# Patient Record
Sex: MALE | Race: WHITE | Employment: OTHER | URBAN - METROPOLITAN AREA
[De-identification: names, ages, dates, MRNs, and addresses within clinical notes are randomized per-mention and may not be internally consistent; named-entity substitution may affect disease eponyms.]

---

## 2018-06-23 ENCOUNTER — TRANSFERRED RECORDS (OUTPATIENT)
Dept: HEALTH INFORMATION MANAGEMENT | Facility: CLINIC | Age: 83
End: 2018-06-23

## 2018-07-07 ENCOUNTER — APPOINTMENT (OUTPATIENT)
Dept: MRI IMAGING | Facility: CLINIC | Age: 83
End: 2018-07-07
Attending: EMERGENCY MEDICINE
Payer: MEDICARE

## 2018-07-07 ENCOUNTER — APPOINTMENT (OUTPATIENT)
Dept: CT IMAGING | Facility: CLINIC | Age: 83
End: 2018-07-07
Attending: EMERGENCY MEDICINE
Payer: MEDICARE

## 2018-07-07 ENCOUNTER — HOSPITAL ENCOUNTER (EMERGENCY)
Facility: CLINIC | Age: 83
Discharge: HOME | End: 2018-07-07
Attending: EMERGENCY MEDICINE | Admitting: EMERGENCY MEDICINE
Payer: MEDICARE

## 2018-07-07 ENCOUNTER — APPOINTMENT (OUTPATIENT)
Dept: GENERAL RADIOLOGY | Facility: CLINIC | Age: 83
End: 2018-07-07
Attending: EMERGENCY MEDICINE
Payer: MEDICARE

## 2018-07-07 VITALS
WEIGHT: 187 LBS | RESPIRATION RATE: 16 BRPM | SYSTOLIC BLOOD PRESSURE: 140 MMHG | HEIGHT: 70 IN | DIASTOLIC BLOOD PRESSURE: 81 MMHG | TEMPERATURE: 97.5 F | BODY MASS INDEX: 26.77 KG/M2 | OXYGEN SATURATION: 97 % | HEART RATE: 51 BPM

## 2018-07-07 DIAGNOSIS — R07.89 OTHER CHEST PAIN: ICD-10-CM

## 2018-07-07 DIAGNOSIS — R53.83 FATIGUE, UNSPECIFIED TYPE: ICD-10-CM

## 2018-07-07 LAB
ALBUMIN SERPL-MCNC: 3.5 G/DL (ref 3.4–5)
ALBUMIN UR-MCNC: NEGATIVE MG/DL
ALP SERPL-CCNC: 86 U/L (ref 40–150)
ALT SERPL W P-5'-P-CCNC: 20 U/L (ref 0–70)
ANION GAP SERPL CALCULATED.3IONS-SCNC: 4 MMOL/L (ref 3–14)
APPEARANCE UR: CLEAR
AST SERPL W P-5'-P-CCNC: 12 U/L (ref 0–45)
BASOPHILS # BLD AUTO: 0 10E9/L (ref 0–0.2)
BASOPHILS NFR BLD AUTO: 0.2 %
BILIRUB DIRECT SERPL-MCNC: <0.1 MG/DL (ref 0–0.2)
BILIRUB SERPL-MCNC: 0.5 MG/DL (ref 0.2–1.3)
BILIRUB UR QL STRIP: NEGATIVE
BUN SERPL-MCNC: 22 MG/DL (ref 7–30)
CALCIUM SERPL-MCNC: 8.5 MG/DL (ref 8.5–10.1)
CHLORIDE SERPL-SCNC: 109 MMOL/L (ref 94–109)
CO2 SERPL-SCNC: 28 MMOL/L (ref 20–32)
COLOR UR AUTO: YELLOW
CREAT SERPL-MCNC: 1.15 MG/DL (ref 0.66–1.25)
DIFFERENTIAL METHOD BLD: NORMAL
EOSINOPHIL # BLD AUTO: 0.1 10E9/L (ref 0–0.7)
EOSINOPHIL NFR BLD AUTO: 0.8 %
ERYTHROCYTE [DISTWIDTH] IN BLOOD BY AUTOMATED COUNT: 11.9 % (ref 10–15)
ETHANOL SERPL-MCNC: <0.01 G/DL
GFR SERPL CREATININE-BSD FRML MDRD: 61 ML/MIN/1.7M2
GLUCOSE BLDC GLUCOMTR-MCNC: 82 MG/DL (ref 70–99)
GLUCOSE BLDC GLUCOMTR-MCNC: 86 MG/DL (ref 70–99)
GLUCOSE SERPL-MCNC: 61 MG/DL (ref 70–99)
GLUCOSE UR STRIP-MCNC: NEGATIVE MG/DL
HCT VFR BLD AUTO: 42 % (ref 40–53)
HGB BLD-MCNC: 14.3 G/DL (ref 13.3–17.7)
HGB UR QL STRIP: NEGATIVE
IMM GRANULOCYTES # BLD: 0 10E9/L (ref 0–0.4)
IMM GRANULOCYTES NFR BLD: 0.3 %
KETONES UR STRIP-MCNC: NEGATIVE MG/DL
LEUKOCYTE ESTERASE UR QL STRIP: NEGATIVE
LYMPHOCYTES # BLD AUTO: 1.8 10E9/L (ref 0.8–5.3)
LYMPHOCYTES NFR BLD AUTO: 19.5 %
MCH RBC QN AUTO: 31.4 PG (ref 26.5–33)
MCHC RBC AUTO-ENTMCNC: 34 G/DL (ref 31.5–36.5)
MCV RBC AUTO: 92 FL (ref 78–100)
MONOCYTES # BLD AUTO: 1 10E9/L (ref 0–1.3)
MONOCYTES NFR BLD AUTO: 11.5 %
MUCOUS THREADS #/AREA URNS LPF: PRESENT /LPF
NEUTROPHILS # BLD AUTO: 6.1 10E9/L (ref 1.6–8.3)
NEUTROPHILS NFR BLD AUTO: 67.7 %
NITRATE UR QL: NEGATIVE
NRBC # BLD AUTO: 0 10*3/UL
NRBC BLD AUTO-RTO: 0 /100
NT-PROBNP SERPL-MCNC: 94 PG/ML (ref 0–1800)
PH UR STRIP: 5 PH (ref 5–7)
PLATELET # BLD AUTO: 166 10E9/L (ref 150–450)
POTASSIUM SERPL-SCNC: 4.4 MMOL/L (ref 3.4–5.3)
PROT SERPL-MCNC: 6.5 G/DL (ref 6.8–8.8)
RBC # BLD AUTO: 4.56 10E12/L (ref 4.4–5.9)
RBC #/AREA URNS AUTO: 0 /HPF (ref 0–2)
SODIUM SERPL-SCNC: 141 MMOL/L (ref 133–144)
SOURCE: ABNORMAL
SP GR UR STRIP: 1.01 (ref 1–1.03)
TROPONIN I BLD-MCNC: 0 UG/L (ref 0–0.1)
TROPONIN I BLD-MCNC: 0 UG/L (ref 0–0.1)
TSH SERPL DL<=0.005 MIU/L-ACNC: 0.77 MU/L (ref 0.4–4)
UROBILINOGEN UR STRIP-MCNC: 0 MG/DL (ref 0–2)
WBC # BLD AUTO: 9 10E9/L (ref 4–11)
WBC #/AREA URNS AUTO: <1 /HPF (ref 0–5)

## 2018-07-07 PROCEDURE — 80076 HEPATIC FUNCTION PANEL: CPT | Performed by: EMERGENCY MEDICINE

## 2018-07-07 PROCEDURE — 25000128 H RX IP 250 OP 636: Performed by: EMERGENCY MEDICINE

## 2018-07-07 PROCEDURE — 84443 ASSAY THYROID STIM HORMONE: CPT | Performed by: EMERGENCY MEDICINE

## 2018-07-07 PROCEDURE — 99285 EMERGENCY DEPT VISIT HI MDM: CPT | Mod: 25

## 2018-07-07 PROCEDURE — 84484 ASSAY OF TROPONIN QUANT: CPT

## 2018-07-07 PROCEDURE — 70450 CT HEAD/BRAIN W/O DYE: CPT

## 2018-07-07 PROCEDURE — 85025 COMPLETE CBC W/AUTO DIFF WBC: CPT | Performed by: EMERGENCY MEDICINE

## 2018-07-07 PROCEDURE — 80320 DRUG SCREEN QUANTALCOHOLS: CPT | Performed by: EMERGENCY MEDICINE

## 2018-07-07 PROCEDURE — 00000146 ZZHCL STATISTIC GLUCOSE BY METER IP

## 2018-07-07 PROCEDURE — 96360 HYDRATION IV INFUSION INIT: CPT | Mod: 59

## 2018-07-07 PROCEDURE — 71046 X-RAY EXAM CHEST 2 VIEWS: CPT

## 2018-07-07 PROCEDURE — 93005 ELECTROCARDIOGRAM TRACING: CPT

## 2018-07-07 PROCEDURE — 80048 BASIC METABOLIC PNL TOTAL CA: CPT | Performed by: EMERGENCY MEDICINE

## 2018-07-07 PROCEDURE — A9585 GADOBUTROL INJECTION: HCPCS | Performed by: EMERGENCY MEDICINE

## 2018-07-07 PROCEDURE — 96361 HYDRATE IV INFUSION ADD-ON: CPT

## 2018-07-07 PROCEDURE — 83880 ASSAY OF NATRIURETIC PEPTIDE: CPT | Performed by: EMERGENCY MEDICINE

## 2018-07-07 PROCEDURE — 70553 MRI BRAIN STEM W/O & W/DYE: CPT

## 2018-07-07 PROCEDURE — 81001 URINALYSIS AUTO W/SCOPE: CPT | Performed by: EMERGENCY MEDICINE

## 2018-07-07 RX ORDER — GADOBUTROL 604.72 MG/ML
7.5 INJECTION INTRAVENOUS ONCE
Status: COMPLETED | OUTPATIENT
Start: 2018-07-07 | End: 2018-07-07

## 2018-07-07 RX ORDER — AMOXICILLIN 500 MG
1200 CAPSULE ORAL DAILY
COMMUNITY

## 2018-07-07 RX ORDER — DUTASTERIDE 0.5 MG/1
0.5 CAPSULE, LIQUID FILLED ORAL DAILY
COMMUNITY

## 2018-07-07 RX ADMIN — GADOBUTROL 7.5 ML: 604.72 INJECTION INTRAVENOUS at 14:52

## 2018-07-07 RX ADMIN — SODIUM CHLORIDE 500 ML: 9 INJECTION, SOLUTION INTRAVENOUS at 12:01

## 2018-07-07 ASSESSMENT — ENCOUNTER SYMPTOMS
FATIGUE: 1
NAUSEA: 0
FEVER: 0
DIZZINESS: 1
VOMITING: 0
CONSTIPATION: 1
SPEECH DIFFICULTY: 1
DIARRHEA: 0

## 2018-07-07 NOTE — ED AVS SNAPSHOT
[facility] Emergency Department    [address]    [facility] [address]    Phone:  [phone]    Fax:  [phone]                                       Harsh Hernandez   MRN: [id number]    Department:  [facility] Emergency Department   Date of Visit:  7/7/2018           Patient Information     Date Of Birth          1935        Your diagnoses for this visit were:     Fatigue, unspecified type     Other chest pain        You were seen by Yaron Obrien MD.      Follow-up Information     Follow up with your Doctor. Call in 2 days.    Why:  for follow up        Follow up with [facility] Emergency Department.    Specialty:  EMERGENCY MEDICINE    Why:  If symptoms worsen    Contact information:    [address]  [facility] [address] [phone]        Discharge Instructions          *CHEST PAIN, UNCERTAIN CAUSE    Based on your exam today, the exact cause of your chest pain is not certain. Your condition does not seem serious at this time, and your pain does not appear to be coming from your heart. However, sometimes the signs of a serious problem take more time to appear. Therefore, watch for the warning signs listed below.  HOME CARE:    1. Rest today and avoid strenuous activity.  2. Take any prescribed medicine as directed.  FOLLOW UP with your doctor in 1-3 days.   GET PROMPT MEDICAL ATTENTION if any of the following occur:    A change in the type of pain: if it feels different, becomes more severe, lasts longer, or begins to spread into your shoulder, arm, neck, jaw or back    Shortness of breath or increased pain with breathing    Weakness, dizziness, or fainting    Cough with blood or dark colored sputum (phlegm)    Fever over 101  F (38.3  C)    Swelling, pain or redness in one leg    4869-4023 [employer]. [address] [place] [address], [place] [address]. All rights reserved. This information is not intended as a  substitute for professional medical care. Always follow your healthcare professional's instructions.  This information has been modified by your health care provider with permission from the publisher.      Discharge References/Attachments     DIZZINESS, UNCERTAIN CAUSE (ENGLISH)      24 Hour Appointment Hotline       To make an appointment at any [facility], call [phone] ([phone]). If you don't have a family doctor or clinic, we will help you find one. [facility] are conveniently located to serve the needs of you and your family.             Review of your medicines      Our records show that you are taking the medicines listed below. If these are incorrect, please call your family doctor or clinic.        Dose / Directions Last dose taken    aspirin 81 MG tablet   Dose:  81 mg        Take 81 mg by mouth daily   Refills:  0        dutasteride 0.5 MG capsule   Commonly known as:  AVODART   Dose:  0.5 mg        Take 0.5 mg by mouth daily   Refills:  0        fish Oil 1200 MG capsule   Dose:  1200 mg        Take 1,200 mg by mouth daily   Refills:  0        ICAPS AREDS 2 PO        Refills:  0        LESCOL PO        Refills:  0        NITROGLYCERIN SL   Dose:  0.4 mg        Place 0.4 mg under the tongue   Refills:  0                Procedures and tests performed during your visit     Procedure/Test Number of Times Performed    Alcohol ethyl 1    BNP 1    Basic metabolic panel (BMP) 1    CBC + differential 1    Cardiac Continuous Monitoring 1    EKG 12 lead 1    Glucose by meter 2    Head CT w/o contrast 1    Hepatic panel 1    ISTAT troponin 1    IV access 1    MR Brain w/o & w Contrast 1    TSH with free T4 reflex 1    Troponin POCT 2    UA with Microscopic 1    XR Chest 2 Views 1      Orders Needing Specimen Collection     None      Pending Results     Date and Time Order Name Status Description    7/7/2018 1259 EKG 12 lead Preliminary             Pending Culture Results     No orders found  from 7/5/2018 to 7/8/2018.            Pending Results Instructions     If you had any lab results that were not finalized at the time of your Discharge, you can call the ED Lab Result RN at [phone]. You will be contacted by this team for any positive Lab results or changes in treatment. The nurses are available 7 days a week from 10A to 6:30P.  You can leave a message 24 hours per day and they will return your call.        Test Results From Your Hospital Stay        7/7/2018 12:05 PM      Component Results     Component Value Ref Range & Units Status    WBC 9.0 4.0 - 11.0 10e9/L Final    RBC Count 4.56 4.4 - 5.9 10e12/L Final    Hemoglobin 14.3 13.3 - 17.7 g/dL Final    Hematocrit 42.0 40.0 - 53.0 % Final    MCV 92 78 - 100 fl Final    MCH 31.4 26.5 - 33.0 pg Final    MCHC 34.0 31.5 - 36.5 g/dL Final    RDW 11.9 10.0 - 15.0 % Final    Platelet Count 166 150 - 450 10e9/L Final    Diff Method Automated Method  Final    % Neutrophils 67.7 % Final    % Lymphocytes 19.5 % Final    % Monocytes 11.5 % Final    % Eosinophils 0.8 % Final    % Basophils 0.2 % Final    % Immature Granulocytes 0.3 % Final    Nucleated RBCs 0 0 /100 Final    Absolute Neutrophil 6.1 1.6 - 8.3 10e9/L Final    Absolute Lymphocytes 1.8 0.8 - 5.3 10e9/L Final    Absolute Monocytes 1.0 0.0 - 1.3 10e9/L Final    Absolute Eosinophils 0.1 0.0 - 0.7 10e9/L Final    Absolute Basophils 0.0 0.0 - 0.2 10e9/L Final    Abs Immature Granulocytes 0.0 0 - 0.4 10e9/L Final    Absolute Nucleated RBC 0.0  Final         7/7/2018 12:25 PM      Component Results     Component Value Ref Range & Units Status    Sodium 141 133 - 144 mmol/L Final    Potassium 4.4 3.4 - 5.3 mmol/L Final    Chloride 109 94 - 109 mmol/L Final    Carbon Dioxide 28 20 - 32 mmol/L Final    Anion Gap 4 3 - 14 mmol/L Final    Glucose 61 (L) 70 - 99 mg/dL Final    Urea Nitrogen 22 7 - 30 mg/dL Final    Creatinine 1.15 0.66 - 1.25 mg/dL Final    GFR Estimate 61 >60 mL/min/1.7m2 Final    Non   GFR Calc    GFR Estimate If Black 74 >60 mL/min/1.7m2 Final    African American GFR Calc    Calcium 8.5 8.5 - 10.1 mg/dL Final         7/7/2018 12:55 PM      Narrative     CHEST TWO VIEWS  7/7/2018 12:39 PM     HISTORY: chest discomfort;     COMPARISON: None.        Impression     IMPRESSION:   Heart and pulmonary vessels within normal limits. Lungs clear. No  pleural effusion. Sternotomy wires and degenerative changes in the  thoracic spine. Tortuous descending thoracic aorta.    LIZBETH OREILLY MD         7/7/2018 12:25 PM      Component Results     Component Value Ref Range & Units Status    N-Terminal Pro BNP Inpatient 94 0 - 1800 pg/mL Final       Reference range shown and results flagged as abnormal are suggested inpatient   cut points for confirming diagnosis if CHF in an acute setting. Establishing a   baseline value for each individual patient is useful for follow-up. An   inpatient or emergency department NT-proPBNP <300 pg/mL effectively rules out   acute CHF, with 99% negative predictive value.  The outpatient non-acute reference range for ruling out CHF is:   0-125 pg/mL (age 18 to less than 75)   0-450 pg/mL (age 75 yrs and older)           7/7/2018  1:15 PM      Narrative     CT SCAN OF THE HEAD WITHOUT CONTRAST   7/7/2018 12:33 PM     HISTORY: Some confusion, weakness.     TECHNIQUE: Axial images of the head and coronal reformations without  IV contrast material. Radiation dose for this scan was reduced using  automated exposure control, adjustment of the mA and/or kV according  to patient size, or iterative reconstruction technique.    COMPARISON: None.    FINDINGS: There is no evidence of intracranial hemorrhage, mass, acute  infarct or anomaly. There is generalized atrophy of the brain. There  is low attenuation in the white matter of the cerebral hemispheres  consistent with sequelae of small vessel ischemic disease. Ventricular  size is within normal limits without evidence of  hydrocephalus.     The visualized portions of the sinuses and mastoids appear normal. The  bony calvarium and bones of the skull base appear intact.         Impression     IMPRESSION:     1. No evidence of acute intracranial hemorrhage, mass, or herniation.  2. There is generalized atrophy of the brain. White matter changes are  present in the cerebral hemispheres that are consistent with small  vessel ischemic disease in this age patient.      ENID NICOLAS MD         7/7/2018 12:10 PM      Component Results     Component Value Ref Range & Units Status    Troponin I 0.00 0.00 - 0.10 ug/L Final         7/7/2018  1:10 PM      Component Results     Component Value Ref Range & Units Status    Color Urine Yellow  Final    Appearance Urine Clear  Final    Glucose Urine Negative NEG^Negative mg/dL Final    Bilirubin Urine Negative NEG^Negative Final    Ketones Urine Negative NEG^Negative mg/dL Final    Specific Gravity Urine 1.009 1.003 - 1.035 Final    Blood Urine Negative NEG^Negative Final    pH Urine 5.0 5.0 - 7.0 pH Final    Protein Albumin Urine Negative NEG^Negative mg/dL Final    Urobilinogen mg/dL 0.0 0.0 - 2.0 mg/dL Final    Nitrite Urine Negative NEG^Negative Final    Leukocyte Esterase Urine Negative NEG^Negative Final    Source Midstream Urine  Final    WBC Urine <1 0 - 5 /HPF Final    RBC Urine 0 0 - 2 /HPF Final    Mucous Urine Present (A) NEG^Negative /LPF Final         7/7/2018 12:41 PM      Component Results     Component Value Ref Range & Units Status    Bilirubin Direct <0.1 0.0 - 0.2 mg/dL Final    Bilirubin Total 0.5 0.2 - 1.3 mg/dL Final    Albumin 3.5 3.4 - 5.0 g/dL Final    Protein Total 6.5 (L) 6.8 - 8.8 g/dL Final    Alkaline Phosphatase 86 40 - 150 U/L Final    ALT 20 0 - 70 U/L Final    AST 12 0 - 45 U/L Final         7/7/2018 12:38 PM      Component Results     Component Value Ref Range & Units Status    Ethanol g/dL <0.01 <0.01 g/dL Final         7/7/2018 12:41 PM      Component Results      Component Value Ref Range & Units Status    TSH 0.77 0.40 - 4.00 mU/L Final         7/7/2018  3:20 PM      Narrative     MRI BRAIN WITHOUT AND WITH CONTRAST  7/7/2018 2:50 PM    HISTORY:  Dizziness chronic, weakness, slight speech change.      TECHNIQUE:  Multiplanar, multisequence MRI of the brain without and  with 7.5mL Gadavist.    COMPARISON: Head CT from earlier the same day.    FINDINGS: There is no evidence of hemorrhage, mass, acute infarct, or  anomaly. There is generalized atrophy of the brain. White matter T2  hyperintensities are seen in the cerebral hemispheres consistent with  sequelae of small vessel ischemic disease. Ventricular size is within  normal limits without evidence of hydrocephalus.     There is no abnormal intracranial enhancement.     Polypoid mucosal thickening along the inferior right maxillary sinus.  Mild mucosal thickening in the ethmoid air cells. The major arterial  T2 flow voids at the base of the brain appear patent.        Impression     IMPRESSION:    1. No evidence of acute infarct, mass, hemorrhage, or herniation.  2. There is generalized atrophy of the brain. White matter changes are  present in the cerebral hemispheres that are consistent with small  vessel ischemic disease in this age patient.      ENID NICOLAS MD         7/7/2018  1:18 PM      Component Results     Component Value Ref Range & Units Status    Glucose 86 70 - 99 mg/dL Final         7/7/2018  2:22 PM      Component Results     Component Value Ref Range & Units Status    Troponin I 0.00 0.00 - 0.10 ug/L Final         7/7/2018  3:45 PM      Component Results     Component Value Ref Range & Units Status    Glucose 82 70 - 99 mg/dL Final                Clinical Quality Measure: Blood Pressure Screening     Your blood pressure was checked while you were in the emergency department today. The last reading we obtained was  BP: 167/75 (Simultaneous filing. User may not have seen previous data.) . Please read the  "guidelines below about what these numbers mean and what you should do about them.  If your systolic blood pressure (the top number) is less than 120 and your diastolic blood pressure (the bottom number) is less than 80, then your blood pressure is normal. There is nothing more that you need to do about it.  If your systolic blood pressure (the top number) is 120-139 or your diastolic blood pressure (the bottom number) is 80-89, your blood pressure may be higher than it should be. You should have your blood pressure rechecked within a year by a primary care provider.  If your systolic blood pressure (the top number) is 140 or greater or your diastolic blood pressure (the bottom number) is 90 or greater, you may have high blood pressure. High blood pressure is treatable, but if left untreated over time it can put you at risk for heart attack, stroke, or kidney failure. You should have your blood pressure rechecked by a primary care provider within the next 4 weeks.  If your provider in the emergency department today gave you specific instructions to follow-up with your doctor or provider even sooner than that, you should follow that instruction and not wait for up to 4 weeks for your follow-up visit.        Thank you for choosing [facility]       Thank you for choosing [facility] for your care. Our goal is always to provide you with excellent care. Hearing back from our patients is one way we can continue to improve our services. Please take a few minutes to complete the written survey that you may receive in the mail after you visit with us. Thank you!        [email or web address] Information     [employer] lets you send messages to your doctor, view your test results, renew your prescriptions, schedule appointments and more. To sign up, go to [email or web address] . Click on \"Log in\" on the left side of the screen, which will take you to the Welcome page. Then click on \"Sign up Now\" on the right side of the page.     You will be " asked to enter the access code listed below, as well as some personal information. Please follow the directions to create your username and password.     Your access code is: [email or web address]  Expires: 10/5/2018  4:42 PM     Your access code will  in 90 days. If you need help or a new code, please call your [facility] clinic or [phone].        Care EveryWhere ID     This is your Care EveryWhere ID. This could be used by other organizations to access your [facility] medical records  [id number]        Equal Access to Services     [facility]: Hadleticia Pérez, wahayes hooker, thomas kaalsin veronica, berhane bailey . So [facility] [phone].    ATENCIÓN: Si habla español, tiene a guo disposición servicios gratuitos de asistencia lingüística. Huang al [phone].    We comply with applicable federal civil rights laws and [place] laws. We do not discriminate on the basis of race, color, national origin, age, disability, sex, sexual orientation, or gender identity.            After Visit Summary       This is your record. Keep this with you and show to your community pharmacist(s) and doctor(s) at your next visit.

## 2018-07-07 NOTE — DISCHARGE INSTRUCTIONS
*CHEST PAIN, UNCERTAIN CAUSE    Based on your exam today, the exact cause of your chest pain is not certain. Your condition does not seem serious at this time, and your pain does not appear to be coming from your heart. However, sometimes the signs of a serious problem take more time to appear. Therefore, watch for the warning signs listed below.  HOME CARE:    1. Rest today and avoid strenuous activity.  2. Take any prescribed medicine as directed.  FOLLOW UP with your doctor in 1-3 days.   GET PROMPT MEDICAL ATTENTION if any of the following occur:    A change in the type of pain: if it feels different, becomes more severe, lasts longer, or begins to spread into your shoulder, arm, neck, jaw or back    Shortness of breath or increased pain with breathing    Weakness, dizziness, or fainting    Cough with blood or dark colored sputum (phlegm)    Fever over 101  F (38.3  C)    Swelling, pain or redness in one leg    [id number] [employer]. [address] [place] [address]. All rights reserved. This information is not intended as a substitute for professional medical care. Always follow your healthcare professional's instructions.  This information has been modified by your health care provider with permission from the publisher.

## 2018-07-07 NOTE — ED PROVIDER NOTES
History     Chief Complaint:  Chest pressure    The history is provided by the patient and a relative.      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 To note the patient was seen in the ED about two weeks ago for dizziness and falling.  Earlier this morning, the patient states he woke up got in shower, cleaned himself, and shaved before going back to lay down in bed before breakfast because he was fatigued, something unusual for him. Wife found the patient around 0830 in bed about a half hour after patient got out of the shower because he had not yet come down for breakfast. Patient was found staring up at the ceiling and would not respond right away. Family notes the patient's speech was slightly slowed down and jumbled at the time, noting this has continued throughout the morning. The patient was slow to dress and was fumbling with his shirt buttons, thinking the button holes were not big enough and he had not had the time to make the holes bigger with a pocket knife, as he had done with other shirts of his. When the patient was going to get up, he states he was slightly dizzy and needed help to keep his balance while walking to keep from stumbling and falling. Family states patient has had this ongoing dizziness for one year and had a recent CT neck and MRI for this. Daughter states patient has also had physical therapy to help with his dizziness and difficulty walking.    Then just after breakfast around 1000, the patient started complaining of a chest tightness, thinking it was maybe indigestion. Patient does not have a history of heart burn. He states this pain ebbs and flows and did go into his neck at the time. Family states they called a family friend physician regarding this. Patient had already at the time taken a baby aspirin. Patient does not take blood thinners. He did not take  nitroglycerin when he had this chest pressure.  The patient does have a heart history, notable to triple bypass in 2007. The unusual fatigue, speech changes, and the new chest pressure prompted the patient to seek evaluation here in the emergency department.    Here, the patient denies strokes to the best of his knowledge. He states he felt good yesterday. He does note while on a short walk thought, he got slightly dizziness and turned around to go home. He denies chest tightness during the walk. He denies recent illness, cough, fevers, nausea, vomiting, and diarrhea. He states he is intermittently constipated. He states he is drinking more water than he used to in the past. Family denies the patient having cardiac symptoms this past week, noting the patient has only been having dizzy spells and difficulty walking. Patient has also recently worn a blood pressure cuff secondary to drastic blood pressure changes when orthostatic vitals taken. No medication changes recently. He denies history of bladder infections.     Cardiac/PE/DVT Risk Factors:  The patient has a history of MI, hyperlipidemia, and smoking but denies hypertension, hyperlipidemia, and diabetes. The patient denies any personal or familial history of PE, DVT, or clotting disorder. The patient reports denies recent travel, surgery, or other immobilizations.     Allergies:  Naproxen  ibuprofen   Voltaren     Medications:    Baby aspirin  statin  Nitroglycerin, as needed     Past Medical History:    MI  Orthostatic hypotension  PVD  Arteriosclerotic cerebrovascular disease  Aortic stenosis  Cerebrovascular disease  CAD  Hyperlipidemia  Dizziness    Past Surgical History:    CABG   Cardiac stent placement  Right elbow surgery  Bilateral hip surgery  Right knee replacement  Cardiac catheterization  Tonsillectomy  Cataract surgery    Family History:    No past pertinent family history.    Social History:  Former smoker  Positive for alcohol use  Patient  "presents with family   Review of Systems   Constitutional: Positive for fatigue. Negative for fever.   Cardiovascular: Positive for chest pain.   Gastrointestinal: Positive for constipation. Negative for diarrhea, nausea and vomiting.   Neurological: Positive for dizziness and speech difficulty.   All other systems reviewed and are negative.    Physical Exam   First Vitals:  BP: 167/78  Pulse: 51  Heart Rate: 51  Temp: 97.5  F (36.4  C)  Resp: 14  Height: 177.8 cm (5' 10\")  Weight: 84.8 kg (187 lb)  SpO2: 98 %      Physical Exam    Vital signs and nursing notes reviewed.     Constitutional: laying on gurney appears comfortable  HENT: Oropharynx is clear and mildly dry  Eyes: Conjunctivae are normal bilaterally. Pupils equal, EOM's normal without nystagmus  Neck: normal range of motion  Cardiovascular: bradycardic rate, regular rhythm, normal heart sounds.   Pulmonary/Chest: Effort normal and breath sounds normal. No respiratory distress.   Abdominal: Soft. Bowel sounds are normal. No tenderness to palpation. No rebound or guarding.   Musculoskeletal: No joint swelling or edema.   Neurological: Alert and oriented. No focal weakness in upper or lower extremities.  No facial motor weakness.  Denies paresthesias.  intermittent confusing statements and rare word finding difficulty.  No munir confusion  Skin: Skin is warm and dry. No rash noted.   Psych: normal affect    Emergency Department Course   ECG:  Indication: chest pressure   Time: 1111  Vent. Rate 50 bpm. ND interval 232. QRS duration 156. QT/QTc 462/421. P-R-T axis 49 -54 -26. Sinus bradycardia with 1st degree AV block. Right bundle branch block. Left anterior fascicular block. Bifascicular block. Moderate voltage criteria for LVH, may be normal variant. Abnormal ECG. Agrees with computer interpretation.  Read time: 1114.    Imaging:  Radiographic findings were communicated with the patient who voiced understanding of the findings.    XR Chest 2 views:   Heart " and pulmonary vessels within normal limits. Lungs clear. No  pleural effusion. Sternotomy wires and degenerative changes in the  thoracic spine. Tortuous descending thoracic aorta. As per radiology.     CT Head without contrast:   1. No evidence of acute intracranial hemorrhage, mass, or herniation.  2. There is generalized atrophy of the brain. White matter changes are  present in the cerebral hemispheres that are consistent with small  vessel ischemic disease in this age patient. As per radiology.    MR Brain w/o & w Contrast:  1. No evidence of acute infarct, mass, hemorrhage, or herniation.  2. There is generalized atrophy of the brain. White matter changes are  present in the cerebral hemispheres that are consistent with small  vessel ischemic disease in this age patient. As per radiology.     Laboratory:  CBC: WBC: 9.0, HGB: 14.3, PLT: 166  BMP: Glucose 61 (L), o/w WNL (Creatinine: 1.15)  BNP: 94  1157 Troponin POCT: 0.00  1409 Troponin: 0.00  Alcohol level blood: <0.01  Hepatic panel: Bilirubin direct <0.1 / Bilirubin Total 0.5 / Albumin 3.5 / Protein Total 6.5 (L) / Alkphos 86 / ALT 20 / AST 12  TSH with free T4 reflex: 0.77  1306 Glucose by meter: 86  1534 Glucose by meter: 82    UA with micro: mucous present, o/w negative    Interventions:  1201 NS 1L IV    Emergency Department Course:  1145 Nursing notes and vitals reviewed.  I performed an exam of the patient as documented above.     IV inserted. Medicine administered as documented above. Blood drawn. This was sent to the lab for further testing, results above.    The patient was placed on continuous pulse oximetry and cardiac monitoring while here in the ED.      The patient was sent for a chest xray, head CT, and brain MRI while in the emergency department, findings above.     1249 I rechecked the patient and discussed the results of his workup thus far.     1630 I rechecked the patient and discussed the results of their workup thus far.     Findings  and plan explained to the Patient and spouse. Patient discharged home with instructions regarding supportive care, medications, and reasons to return. The importance of close follow-up was reviewed.     I personally reviewed the laboratory results with the Patient and spouse and answered all related questions prior to discharge.   Impression & Plan    Medical Decision Making:  Harsh Hernandez is a 82 year old male who presents after having an episode of extreme fatigue and tiredness after having showering this morning. He also had an episode of burning in his chest after eating breakfast that has since dissipated. Patient seemed to have some mild confusion and at times difficulty finding words on initial presentation. We had no other focal findings. His ECG is unchanged from his baseline ECG seen from an outlying facility. His lab tests showed no significant abnormalities, including serial troponins. I did a CT of the head and then MRI of the brain which are also unremarkable for signs of an acute stroke. Patient is given some IV fluids and he has had no concerning dysrhythmias or vital sign abnormalities. There is no clear indication that he needs to be admitted for further findings. He was ambulated in the ED on the way to the bathroom without significant dizziness or fatigue. His symptomology could be very well have been due to being somewhat intravascularly dry, and IV fluids seemed to have helped his symptoms. Patients will follow up with his primary care physician or cardiology as an outpatient as he is going home on Monday. Otherwise he should return here for any worsening.     Critical Care time:  none    Diagnosis:    ICD-10-CM    1. Fatigue, unspecified type R53.83    2. Other chest pain R07.89        Disposition:  discharged to home with wife    Scribe Disclosure:  I, Katie Bang, am serving as a scribe on 7/7/2018 at 11:20 AM to personally document services performed by Yaron Obrien MD based on  my observations and the provider's statements to me.     Katie Bang  7/7/2018   [facility] EMERGENCY DEPARTMENT       Yaron Obrien MD  07/07/18 0607

## 2018-07-07 NOTE — ED AVS SNAPSHOT
[facility] Emergency Department    [address]    [facility] [address]    Phone:  [phone]    Fax:  [phone]                                       Harsh Hernandez   MRN: [id number]    Department:  [facility] Emergency Department   Date of Visit:  7/7/2018           After Visit Summary Signature Page     I have received my discharge instructions, and my questions have been answered. I have discussed any challenges I see with this plan with the nurse or doctor.    ..........................................................................................................................................  Patient/Patient Representative Signature      ..........................................................................................................................................  Patient Representative Print Name and Relationship to Patient    ..................................................               ................................................  Date                                            Time    ..........................................................................................................................................  Reviewed by Signature/Title    ...................................................              ..............................................  Date                                                            Time

## 2018-07-07 NOTE — ED TRIAGE NOTES
PT WOKE UP THIS AM, GOT IN SHOWER AND GOT CLEANED UP/SHAVED, AFTER SHOWER PT FELT VERT FATIGUED/TIERED AND PT LAID BACK DOWN BEFORE BREAKFAST, WIFE STATES SHE FOUND HIM, AFTER ABOUT 30 MIN, LAYING BACK IN BED, IN HIS UNDERWEAR STARING STRAIGHT UP AT CEILING. Pt wife reports having to assist to with sitting up and with buttoning shirt. FOLLOWING PT WAS ABLE MAKE IT DOWN FOR BREAKFAST WHERE HE HAD VERDUGO AND EGGS. FOLLOWING BREAKFAST HE HAD CHEST PAIN, WITHOUT SOB, DIZZINESS, NAUSEA OR DIAPHORESIS. PT CURRENTLY PAIN FREE.

## 2018-07-08 LAB — INTERPRETATION ECG - MUSE: NORMAL
